# Patient Record
Sex: MALE | Race: BLACK OR AFRICAN AMERICAN | NOT HISPANIC OR LATINO | URBAN - NONMETROPOLITAN AREA
[De-identification: names, ages, dates, MRNs, and addresses within clinical notes are randomized per-mention and may not be internally consistent; named-entity substitution may affect disease eponyms.]

---

## 2019-02-17 ENCOUNTER — HOSPITAL ENCOUNTER (EMERGENCY)
Facility: HOSPITAL | Age: 34
Discharge: HOME | End: 2019-02-17
Attending: EMERGENCY MEDICINE | Admitting: EMERGENCY MEDICINE

## 2019-02-17 VITALS
HEART RATE: 76 BPM | OXYGEN SATURATION: 94 % | HEIGHT: 72 IN | RESPIRATION RATE: 15 BRPM | TEMPERATURE: 98 F | WEIGHT: 154 LBS | SYSTOLIC BLOOD PRESSURE: 115 MMHG | DIASTOLIC BLOOD PRESSURE: 76 MMHG | BODY MASS INDEX: 20.86 KG/M2

## 2019-02-17 DIAGNOSIS — F10.920 ALCOHOLIC INTOXICATION WITHOUT COMPLICATION (HCC): ICD-10-CM

## 2019-02-17 DIAGNOSIS — R41.82 ALTERED MENTAL STATUS, UNSPECIFIED ALTERED MENTAL STATUS TYPE: Primary | ICD-10-CM

## 2019-02-17 LAB
HOLD SPECIMEN: NORMAL
HOLD SPECIMEN: NORMAL
WHOLE BLOOD HOLD SPECIMEN: NORMAL
WHOLE BLOOD HOLD SPECIMEN: NORMAL

## 2019-02-17 PROCEDURE — 99283 EMERGENCY DEPT VISIT LOW MDM: CPT

## 2019-02-17 NOTE — DISCHARGE INSTRUCTIONS
Medically cleared for [facility].    Please read and follow all directions.  Take all home medications as previously prescribed.  Please contact your primary care provider in 2-3 days to arrange for outpatient follow-up.  If you do not have one you may use the list below.  Refrain from excessive alcohol consumption.  Do not drink and drive.  Return to the emergency department sooner if symptoms worsen or for any additional concerns.      Follow up with one of the [facility] physician groups below to setup primary care. If you have trouble following up, please call the [facility] Nurse Line at [phone]    (Dr. Daren Parsons DO, Dr. Moi Palmer DO,  CECI Conway, and CECI Staley)  [facility], Primary Care   [address] [address], [address], [place] [address] [phone]     (Dr. Lexii Lopez MD, CECI Lewis, and CECI Chandler)  [facility], Primary Care   [facility] [address], [place] [address] [phone]    (Dr. Octavio Perdomo MD and Dr. Buck Parker MD)  [facility], Primary Care  [address], [address] [phone]    (Dr. Khadar Bianchi MD)  [facility], Primary Care  [address], [address], [place], [place], [address] [phone]
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
## 2022-07-24 ENCOUNTER — APPOINTMENT (OUTPATIENT)
Dept: GENERAL RADIOLOGY | Age: 37
End: 2022-07-24

## 2022-07-24 ENCOUNTER — HOSPITAL ENCOUNTER (EMERGENCY)
Age: 37
Discharge: HOME | End: 2022-07-25

## 2022-07-24 VITALS
TEMPERATURE: 98.3 F | HEIGHT: 72 IN | OXYGEN SATURATION: 100 % | RESPIRATION RATE: 17 BRPM | HEART RATE: 102 BPM | DIASTOLIC BLOOD PRESSURE: 82 MMHG | WEIGHT: 153 LBS | SYSTOLIC BLOOD PRESSURE: 126 MMHG | BODY MASS INDEX: 20.72 KG/M2

## 2022-07-24 DIAGNOSIS — S61.219A LACERATION OF FINGER OF RIGHT HAND, INITIAL ENCOUNTER: Primary | ICD-10-CM

## 2022-07-24 PROCEDURE — 90471 IMMUNIZATION ADMIN: CPT | Performed by: PHYSICIAN ASSISTANT

## 2022-07-24 PROCEDURE — 73130 X-RAY EXAM OF HAND: CPT | Performed by: RADIOLOGY

## 2022-07-24 PROCEDURE — 2500000003 HC RX 250 WO HCPCS

## 2022-07-24 PROCEDURE — 6360000002 HC RX W HCPCS: Performed by: PHYSICIAN ASSISTANT

## 2022-07-24 PROCEDURE — 90715 TDAP VACCINE 7 YRS/> IM: CPT | Performed by: PHYSICIAN ASSISTANT

## 2022-07-24 PROCEDURE — 99284 EMERGENCY DEPT VISIT MOD MDM: CPT

## 2022-07-24 PROCEDURE — 73130 X-RAY EXAM OF HAND: CPT

## 2022-07-24 PROCEDURE — 12004 RPR S/N/AX/GEN/TRK7.6-12.5CM: CPT

## 2022-07-24 RX ORDER — LIDOCAINE HYDROCHLORIDE 10 MG/ML
5 INJECTION, SOLUTION INFILTRATION; PERINEURAL ONCE
Status: COMPLETED | OUTPATIENT
Start: 2022-07-24 | End: 2022-07-24

## 2022-07-24 RX ORDER — LIDOCAINE HYDROCHLORIDE 10 MG/ML
INJECTION, SOLUTION EPIDURAL; INFILTRATION; INTRACAUDAL; PERINEURAL
Status: COMPLETED
Start: 2022-07-24 | End: 2022-07-24

## 2022-07-24 RX ADMIN — TETANUS TOXOID, REDUCED DIPHTHERIA TOXOID AND ACELLULAR PERTUSSIS VACCINE, ADSORBED 0.5 ML: 5; 2.5; 8; 8; 2.5 SUSPENSION INTRAMUSCULAR at 23:04

## 2022-07-24 RX ADMIN — LIDOCAINE HYDROCHLORIDE 5 ML: 10 INJECTION, SOLUTION EPIDURAL; INFILTRATION; INTRACAUDAL; PERINEURAL at 23:01

## 2022-07-24 RX ADMIN — LIDOCAINE HYDROCHLORIDE 5 ML: 10 INJECTION, SOLUTION INFILTRATION; PERINEURAL at 23:01

## 2022-07-24 NOTE — Clinical Note
Adarshchris Valencia was seen and treated in our emergency department on 7/24/2022. He may return to work on 07/27/2022. If you have any questions or concerns, please don't hesitate to call.       Jaime Campos

## 2022-07-25 ASSESSMENT — ENCOUNTER SYMPTOMS
SHORTNESS OF BREATH: 0
ABDOMINAL PAIN: 0

## 2022-07-25 NOTE — ED PROVIDER NOTES
[facility] EMERGENCY DEPT  eMERGENCY dEPARTMENT eNCOUnter      Pt Name: David Montoya  MRN: [id number]  [place] 1985  Date of evaluation: 7/24/2022  Provider: James Arana       Chief Complaint   Patient presents with    Laceration     Lac to right hand after going through window         HISTORY OF PRESENT ILLNESS   (Location/Symptom, Timing/Onset,Context/Setting, Quality, Duration, Modifying Factors, Severity)  Note limiting factors. David Montoya is a 31 y.o. male who denies significant medical history who presents to the emergency department in police custody. The patient has 2 lacerations to the right hand. The patient punched a window and obtained 2 different lacerations at that time. He is also been drinking tonight. He will not note how much he is drank. He denies any other associated pain. He denies any head injury. He is not up-to-date on tetanus shot. HPI    NursingNotes were reviewed. REVIEW OF SYSTEMS    (2-9 systems for level 4, 10 or more for level 5)     Review of Systems   Constitutional:  Negative for chills and fever. Respiratory:  Negative for shortness of breath. Cardiovascular:  Negative for chest pain. Gastrointestinal:  Negative for abdominal pain. Musculoskeletal:  Positive for arthralgias. Skin:  Positive for wound. Neurological:  Negative for dizziness and headaches. All other systems reviewed and are negative. PAST MEDICALHISTORY   History reviewed. No pertinent past medical history. SURGICAL HISTORY     History reviewed. No pertinent surgical history. CURRENT MEDICATIONS   There are no discharge medications for this patient. ALLERGIES     Patient has no known allergies. FAMILY HISTORY     History reviewed. No pertinent family history.        SOCIAL HISTORY       Social History     Socioeconomic History    Marital status:      Spouse name: None    Number of children: None    Years of education: None Highest education level: None   Tobacco Use    Smoking status: Every Day     Packs/day: 0.50     Types: Cigarettes   Substance and Sexual Activity    Alcohol use: Yes    Drug use: Not Currently       SCREENINGS    Desmond Coma Scale  Eye Opening: Spontaneous  Best Verbal Response: Oriented  Best Motor Response: Obeys commands  Desmond Coma Scale Score: 15        PHYSICAL EXAM    (up to 7 for level 4, 8 or more for level 5)     ED Triage Vitals [07/24/22 2233]   BP Temp Temp Source Heart Rate Resp SpO2 Height Weight   126/82 98.3 °F (36.8 °C) Infrared (!) 102 17 100 % 6' (1.829 m) 153 lb (69.4 kg)       Physical Exam  Vitals and nursing note reviewed. Constitutional:       General: He is not in acute distress. Appearance: Normal appearance. He is normal weight. He is not ill-appearing, toxic-appearing or diaphoretic. Comments: Intoxicated   HENT:      Head: Normocephalic and atraumatic. Mouth/Throat:      Mouth: Mucous membranes are moist.   Eyes:      Extraocular Movements: Extraocular movements intact. Pupils: Pupils are equal, round, and reactive to light. Cardiovascular:      Rate and Rhythm: Normal rate and regular rhythm. Pulses: Normal pulses. Pulmonary:      Effort: Pulmonary effort is normal. No respiratory distress. Chest:      Chest wall: No tenderness. Abdominal:      Palpations: Abdomen is soft. Tenderness: There is no abdominal tenderness. Musculoskeletal:      Left wrist: No snuff box tenderness. Right hand: Laceration, tenderness and bony tenderness present. No swelling or deformity. Normal range of motion. Normal sensation. Normal capillary refill. Cervical back: Normal range of motion and neck supple. No swelling, deformity, signs of trauma, rigidity, tenderness or bony tenderness. No pain with movement. Normal range of motion. Thoracic back: No swelling, deformity, tenderness or bony tenderness. Normal range of motion.       Lumbar back: No swelling, deformity, tenderness or bony tenderness. Normal range of motion. Comments: Bilateral upper and lower extremities are negative for tenderness, swelling, decreased range of motion, or deformity besides the right hand. Bilateral upper and lower extremities are neurovascularly intact. 2 lacerations on the right hand. Laceration over the right thumb is approximately 3 cm long and linear, not actively bleeding. Tender to touch. He has no associated decrease in sensitivity concerning for nerve damage. He also does not have any decrease in range of motion at either joint of the thumb. Second laceration is on the dorsal right hand and approximately 4 cm. This wound is jagged. No associated decrease in sensation or movement distal to wound. Not actively bleeding. Lymphadenopathy:      Cervical: No cervical adenopathy. Neurological:      General: No focal deficit present. Mental Status: He is alert and oriented to person, place, and time. Comments: Some words are slurred. The patient did have episodes where he would fall asleep while repairing laceration but remained arousable in the ER       DIAGNOSTIC RESULTS     RADIOLOGY:  Non-plain film images such as CT, Ultrasound and MRI are read by the radiologist. Plain radiographic images are visualized and preliminarily interpreted bythe emergency physician with the below findings:    XR HAND RIGHT (MIN 3 VIEWS)   Final Result   Unremarkable xray. Recommendation:    Follow up as clinically indicated. Note: Direct correlation with point tenderness and the X-ray images is recommended and does significantly increase the sensitivity of radiographic evaluation. Electronically Signed by Mayi Patel MD at 25-Jul-2022 12:44:58 AM                       LABS:  Labs Reviewed - No data to display    All other labs were within normal range or not returned as of this dictation.     EMERGENCY DEPARTMENT COURSE and DIFFERENTIAL DIAGNOSIS/MDM:   Vitals:    Vitals:    07/24/22 2233   BP: 126/82   Pulse: (!) 102   Resp: 17   Temp: 98.3 °F (36.8 °C)   TempSrc: Infrared   SpO2: 100%   Weight: 153 lb (69.4 kg)   Height: 6' (1.829 m)       MDM  Patient is a 46-year-old male who is actively intoxicated brought in by police. On physical exam, he does have 2 different lacerations. There is no obvious open laceration. He was given a tetanus to be brought up-to-date. On physical exam, he has no bony tenderness or signs of trauma other than the right hand so no further work-up was warranted. Due to his mechanism of injury, plain films were obtained and were negative for acute bony fracture or malalignment. The wounds were thoroughly cleaned and dressed prior to closure. There was no foreign body found when inspecting the wounds. He tolerated repair well. I did go over follow-up in 7 to 10 days for suture removal as well as wound management outpatient. He did verbalize understanding. Return precautions were given and all questions were answered. The patient was discharged in police custody.     PROCEDURES:  Unless otherwise noted below, none     Lac Repair    Date/Time: 7/25/2022 12:22 AM  Performed by: VENESSA Arana  Authorized by: VENESSA Arana     Consent:     Consent obtained:  Verbal    Consent given by:  Patient    Risks, benefits, and alternatives were discussed: yes    Anesthesia:     Anesthesia method:  Local infiltration    Local anesthetic:  Lidocaine 1% w/o epi  Laceration details:     Location:  Hand    Hand location:  R hand, dorsum    Length (cm):  4  Pre-procedure details:     Preparation:  Patient was prepped and draped in usual sterile fashion  Exploration:     Hemostasis achieved with:  Direct pressure    Imaging obtained: x-ray      Imaging outcome: foreign body not noted      Wound extent: no foreign bodies/material noted, no nerve damage noted, no tendon damage noted and no underlying fracture noted Treatment:     Area cleansed with:  Chlorhexidine    Irrigation solution:  Sterile saline    Debridement:  None    Undermining:  Minimal  Skin repair:     Repair method:  Sutures    Suture size:  4-0    Suture material:  Nylon    Suture technique:  Simple interrupted    Number of sutures:  4  Approximation:     Approximation:  Close  Repair type:     Repair type:  Simple  Post-procedure details:     Dressing: Dressing applied by nurse. Procedure completion:  Tolerated well, no immediate complications  Lac Repair    Date/Time: 7/25/2022 12:23 AM  Performed by: VENESSA Lyons  Authorized by: VENESSA Lyons     Consent:     Consent obtained:  Verbal    Consent given by:  Patient    Risks, benefits, and alternatives were discussed: yes    Anesthesia:     Anesthesia method:  Local infiltration    Local anesthetic:  Lidocaine 1% w/o epi  Laceration details:     Location:  Finger    Finger location:  R thumb    Length (cm):  4  Pre-procedure details:     Preparation:  Patient was prepped and draped in usual sterile fashion  Exploration:     Hemostasis achieved with:  Direct pressure    Imaging obtained: x-ray      Imaging outcome: foreign body not noted      Wound extent: no foreign bodies/material noted, no nerve damage noted, no tendon damage noted and no underlying fracture noted    Treatment:     Area cleansed with:  Chlorhexidine    Irrigation solution:  Sterile saline    Debridement:  None    Undermining:  None  Skin repair:     Repair method:  Sutures    Suture size:  4-0    Suture material:  Nylon    Suture technique:  Simple interrupted    Number of sutures:  5  Approximation:     Approximation:  Close  Repair type:     Repair type:  Simple  Post-procedure details:     Dressing: Dressing applied by nurse. Procedure completion:  Tolerated well, no immediate complications    FINAL IMPRESSION      1.  Laceration of finger of right hand, initial encounter          DISPOSITION/PLAN   DISPOSITION Decision To Discharge 07/24/2022 11:52:32 PM      PATIENT REFERRED TO:  [address] [place] [address] [phone]          DISCHARGE MEDICATIONS:  There are no discharge medications for this patient.          (Please note that portions of this note were completed with a voice recognition program.  Efforts were made to edit thedictations but occasionally words are mis-transcribed.)    VENESSA Mathew (electronically signed)     Jaime Mathew  07/25/22 8282

## 2022-07-25 NOTE — DISCHARGE INSTRUCTIONS
Follow up in 7-10 days for suture removal and wound check. Return to the ER for new or worsening symptoms.